# Patient Record
Sex: FEMALE | Race: BLACK OR AFRICAN AMERICAN | NOT HISPANIC OR LATINO | Employment: UNEMPLOYED | URBAN - METROPOLITAN AREA
[De-identification: names, ages, dates, MRNs, and addresses within clinical notes are randomized per-mention and may not be internally consistent; named-entity substitution may affect disease eponyms.]

---

## 2024-09-25 ENCOUNTER — TELEPHONE (OUTPATIENT)
Dept: GASTROENTEROLOGY | Facility: CLINIC | Age: 19
End: 2024-09-25
Payer: MEDICAID

## 2024-09-25 NOTE — TELEPHONE ENCOUNTER
----- Message from Love Nunn sent at 9/25/2024  9:56 AM CDT -----  Contact: roxie - mother  Patient is requesting a call back regarding scheduling. Please call back at [phone]

## 2025-01-24 DIAGNOSIS — N92.1 PROLONGED MENSTRUAL CYCLE: Primary | ICD-10-CM

## 2025-02-12 ENCOUNTER — OFFICE VISIT (OUTPATIENT)
Dept: OBSTETRICS AND GYNECOLOGY | Facility: CLINIC | Age: 20
End: 2025-02-12
Payer: MEDICAID

## 2025-02-12 VITALS
WEIGHT: 124.25 LBS | HEART RATE: 87 BPM | SYSTOLIC BLOOD PRESSURE: 119 MMHG | BODY MASS INDEX: 22.87 KG/M2 | DIASTOLIC BLOOD PRESSURE: 79 MMHG | HEIGHT: 62 IN

## 2025-02-12 DIAGNOSIS — N64.52 BREAST DISCHARGE: ICD-10-CM

## 2025-02-12 DIAGNOSIS — N76.0 BV (BACTERIAL VAGINOSIS): ICD-10-CM

## 2025-02-12 DIAGNOSIS — B96.89 BV (BACTERIAL VAGINOSIS): ICD-10-CM

## 2025-02-12 DIAGNOSIS — R79.89 ELEVATED PROLACTIN LEVEL: ICD-10-CM

## 2025-02-12 DIAGNOSIS — Z30.09 FAMILY PLANNING: ICD-10-CM

## 2025-02-12 DIAGNOSIS — Z11.3 VENEREAL DISEASE SCREENING: ICD-10-CM

## 2025-02-12 DIAGNOSIS — Z01.419 WELL WOMAN EXAM: Primary | ICD-10-CM

## 2025-02-12 LAB — PROLACTIN SERPL-MCNC: 40.6 NG/ML (ref 4.79–23.3)

## 2025-02-12 PROCEDURE — 3074F SYST BP LT 130 MM HG: CPT | Mod: CPTII,,, | Performed by: NURSE PRACTITIONER

## 2025-02-12 PROCEDURE — 99385 PREV VISIT NEW AGE 18-39: CPT | Mod: S$PBB,,, | Performed by: NURSE PRACTITIONER

## 2025-02-12 PROCEDURE — 1159F MED LIST DOCD IN RCRD: CPT | Mod: CPTII,,, | Performed by: NURSE PRACTITIONER

## 2025-02-12 PROCEDURE — 3078F DIAST BP <80 MM HG: CPT | Mod: CPTII,,, | Performed by: NURSE PRACTITIONER

## 2025-02-12 PROCEDURE — 3008F BODY MASS INDEX DOCD: CPT | Mod: CPTII,,, | Performed by: NURSE PRACTITIONER

## 2025-02-12 RX ORDER — LACTIC ACID, L-, CITRIC ACID MONOHYDRATE, AND POTASSIUM BITARTRATE 90; 50; 20 MG/5G; MG/5G; MG/5G
1 GEL VAGINAL
Qty: 60 G | Refills: 3 | Status: SHIPPED | OUTPATIENT
Start: 2025-02-12

## 2025-02-12 RX ORDER — NORGESTIMATE AND ETHINYL ESTRADIOL 0.25-0.035
1 KIT ORAL DAILY
Qty: 28 TABLET | Refills: 11 | Status: SHIPPED | OUTPATIENT
Start: 2025-02-12

## 2025-02-12 RX ORDER — NORGESTIMATE AND ETHINYL ESTRADIOL 0.25-0.035
1 KIT ORAL DAILY
COMMUNITY
Start: 2025-01-22 | End: 2025-02-12 | Stop reason: SDUPTHER

## 2025-02-12 RX ORDER — MULTIVITAMIN
1 TABLET ORAL DAILY
COMMUNITY

## 2025-02-12 RX ORDER — METRONIDAZOLE 500 MG/1
500 TABLET ORAL 2 TIMES DAILY
Qty: 14 TABLET | Refills: 0 | Status: SHIPPED | OUTPATIENT
Start: 2025-02-12 | End: 2025-02-19

## 2025-02-12 RX ORDER — FLUOXETINE 10 MG/1
10 CAPSULE ORAL DAILY
COMMUNITY
Start: 2025-02-06

## 2025-02-12 RX ORDER — DEXTROAMPHETAMINE SULFATE, DEXTROAMPHETAMINE SACCHARATE, AMPHETAMINE SULFATE AND AMPHETAMINE ASPARTATE 6.25; 6.25; 6.25; 6.25 MG/1; MG/1; MG/1; MG/1
25 CAPSULE, EXTENDED RELEASE ORAL EVERY MORNING
COMMUNITY
Start: 2025-01-08

## 2025-02-12 RX ORDER — HYDROXYZINE PAMOATE 50 MG/1
50 CAPSULE ORAL 4 TIMES DAILY
COMMUNITY

## 2025-02-12 RX ORDER — FEXOFENADINE HCL 60 MG/1
60 TABLET, FILM COATED ORAL DAILY
COMMUNITY

## 2025-02-12 RX ORDER — FLUOXETINE HYDROCHLORIDE 20 MG/1
20 CAPSULE ORAL DAILY
COMMUNITY
Start: 2025-02-05

## 2025-02-12 NOTE — LETTER
February 12, 2025      [place] [facility] - OB GYN  [address]  [place] [address]  Phone: [phone]  Fax: [phone]       Patient: Natty Fuentes   YOB: 2005  Date of Visit: 02/12/2025    To Whom It May Concern:    Lupe Fuentes  was at [facility] on 02/12/2025. The patient may return to work/school on 02/13/2025 with no restrictions. If you have any questions or concerns, or if I can be of further assistance, please do not hesitate to contact me.    Sincerely,    Yi Lovelace, NP

## 2025-02-12 NOTE — PROGRESS NOTES
"    Subjective:       Patient ID: Natty Fuentes is a 19 y.o. female.    Chief Complaint:  Well Woman and Vaginal Discharge      History of Present Illness   Presents for annual gyn exam. History and past labs reviewed with patient.    Complains right nipple discharge and vaginal discharge.  Reports she noticed nipple discharge when massaging her breast last month   Vaginal discharge has no associated ssx and is intermittent for the last few months.Hx of BV in the past   OCP for contraception  Menarche @ 12  Debut @ 17  1 lifetime partner  Requests extra pregnancy prevention r/t anxiety   Patient's last menstrual period was 2025 (exact date).      OB History          0    Para   0    Term   0       0    AB   0    Living   0         SAB   0    IAB   0    Ectopic   0    Multiple   0    Live Births   0                  Review of Systems  Review of Systems   Constitutional:  Negative for chills and fever.   Respiratory:  Negative for shortness of breath.    Cardiovascular:  Negative for chest pain.   Gastrointestinal:  Negative for abdominal pain, blood in stool, constipation, diarrhea, nausea, vomiting and reflux.   Genitourinary:  Positive for vaginal discharge. Negative for dysmenorrhea, dyspareunia, dysuria, hematuria, hot flashes, menorrhagia, menstrual problem, pelvic pain, vaginal bleeding, postcoital bleeding and vaginal dryness.   Musculoskeletal:  Negative for arthralgias and joint swelling.   Integumentary:  Positive for nipple discharge. Negative for rash, hair changes, breast mass and breast skin changes.   Psychiatric/Behavioral:  Negative for depression. The patient is not nervous/anxious.    Breast: Positive for nipple discharge.Negative for asymmetry, lump, mass and skin changes          Objective:     Vitals:    25 1029   BP: 119/79   Pulse: 87   Weight: 56.4 kg (124 lb 4 oz)   Height: 5' 2" (1.575 m)        Physical Exam:   Constitutional: She is oriented to person, place, " and time. She appears well-developed and well-nourished.    HENT:   Head: Normocephalic and atraumatic.    Eyes: Pupils are equal, round, and reactive to light. Conjunctivae are normal.    Neck: No thyromegaly present.    Cardiovascular:  Normal rate, regular rhythm and normal heart sounds.             Pulmonary/Chest: Effort normal and breath sounds normal. No respiratory distress.        Abdominal: Soft. There is no abdominal tenderness.     Genitourinary:    Inguinal canal, uterus, right adnexa and left adnexa normal.      Pelvic exam was performed with patient supine.   The external female genitalia was normal.   Genitalia hair distrobution normal .     Labial bartholins normal.Cervix is normal. There is vaginal discharge in the vagina. Uterus consistancy normal and Uerus contour normal  Uterus is not tender.           Musculoskeletal: Normal range of motion and moves all extremeties.       Neurological: She is alert and oriented to person, place, and time. She has normal reflexes.    Skin: Skin is warm and dry.    Psychiatric: She has a normal mood and affect. Her behavior is normal. Judgment and thought content normal.       breast exam wnl- no nipple dc, skin changes, masses or lymph nodes palpated bilaterally    Female chaperone present during exam     Slide examined under the microscope and there were per HPF  Clue cells  +many  Hyphae-none  Trichomonas -none  WBC-0-5      Assessment:     1. Well woman exam    2. Venereal disease screening    3. BV (bacterial vaginosis)    4. Family planning    5. Breast discharge              Plan:       Well woman exam  -     C. trachomatis/N. gonorrhoeae by AMP DNA Other; Cervicovaginal  -     Trichomonas Vaginalis, LUIS    Venereal disease screening  -     C. trachomatis/N. gonorrhoeae by AMP DNA Other; Cervicovaginal  -     Trichomonas Vaginalis, LUIS    BV (bacterial vaginosis)  -     metroNIDAZOLE (FLAGYL) 500 MG tablet; Take 1 tablet (500 mg total) by mouth 2 (two)  times a day. for 7 days  Dispense: 14 tablet; Refill: 0  -     POCT WET PREP    Family planning  -     ESTARYLLA 0.25-35 mg-mcg per tablet; Take 1 tablet by mouth once daily.  Dispense: 28 tablet; Refill: 11  -     lactic acid-citric-potassium (PHEXXI) 1.8-1-0.4 % Gel; Place 1 Applicatorful vaginally as needed (before each act of intercourse).  Dispense: 60 g; Refill: 3    Breast discharge  -     Prolactin; Future; Expected date: 02/12/2025       [facility] Women's daily Multi vitamin  Healthy diet, exercise and lifestyle discussed  gardasil discussed   Risk assessment for inherited gyn cancer done   Patient was counseled today on A.C.S. Pap guidelines and recommendations for yearly pelvic exams, mammograms and monthly self breast exams; to see her PCP for other health maintenance.   STD panel collected   Patient was counseled today on vaginitis prevention including :  a. avoiding feminine products such as deoderant soaps, body wash, bubble bath, douches, scented toilet paper, deoderant tampons or pads, feminine wipes, chronic pad use, etc.  b. avoiding other vulvovaginal irritants such as long hot baths, humidity, tight, synthetic clothing, chlorine and sitting around in wet bathing suits  c. wearing cotton underwear, avoiding thong underwear and no underwear to bed  d. taking showers instead of baths and use a hair dryer on cool setting afterwards to dry  e. wearing cotton to exercise and shower immediately after exercise and change clothes  f. using polyurethane condoms without spermicide if sexually active and symptoms are triggered by intercourse    FOLLOW UP: PRN lack of improvement.     Follow up in about 1 year (around 2/12/2026).

## 2025-02-13 LAB
CHLAMYDIA: NEGATIVE
GONORRHEA: NEGATIVE
SOURCE: NORMAL

## 2025-02-14 LAB
SOURCE: NORMAL
TRICHOMONAS AMPLIFIED: NEGATIVE

## 2025-02-17 ENCOUNTER — RESULTS FOLLOW-UP (OUTPATIENT)
Dept: OBSTETRICS AND GYNECOLOGY | Facility: CLINIC | Age: 20
End: 2025-02-17
Payer: MEDICAID

## 2025-02-17 NOTE — PROGRESS NOTES
I attempted to call her about results and sent  a message via portal. I ordered more labs and a breast ultrasound.

## 2025-02-20 ENCOUNTER — TELEPHONE (OUTPATIENT)
Dept: OBSTETRICS AND GYNECOLOGY | Facility: CLINIC | Age: 20
End: 2025-02-20
Payer: MEDICAID

## 2025-02-20 DIAGNOSIS — R82.90 URINE FINDINGS ABNORMAL: Primary | ICD-10-CM

## 2025-02-20 NOTE — TELEPHONE ENCOUNTER
Spoke with the patient and notified her of the results and recommendations. Patient verbalized understanding, Kinza        ----- Message from Yi Lovelace NP sent at 2/17/2025  4:18 PM CST -----  I attempted to call her about results and sent  a message via portal. I ordered more labs and a breast ultrasound.   ----- Message -----  From: Interface, [facility]  Sent: 2/12/2025   4:00 PM CST  To: Yi Lovelace NP

## 2025-02-20 NOTE — TELEPHONE ENCOUNTER
-      Spoke to pt and orders was faxed to scheduling. Pt is aware and voices understanding. Soraida          ---- Message from Lizzy sent at 2/20/2025  3:33 PM CST -----  Contact: Natty Luz is calling in regards a breast ultrasound order, she called the facility and they said they did not received it. She called Phone number  [phone]. She is not sure the name of the facility.Please call her back at [phone]!

## 2025-02-24 DIAGNOSIS — R79.89 ELEVATED TSH: Primary | ICD-10-CM

## 2025-03-12 ENCOUNTER — TELEPHONE (OUTPATIENT)
Dept: OBSTETRICS AND GYNECOLOGY | Facility: CLINIC | Age: 20
End: 2025-03-12
Payer: MEDICAID

## 2025-03-12 DIAGNOSIS — R79.89 ELEVATED TSH: ICD-10-CM

## 2025-03-12 DIAGNOSIS — R79.89 ELEVATED PROLACTIN LEVEL: Primary | ICD-10-CM

## 2025-03-12 NOTE — TELEPHONE ENCOUNTER
Notified of normal breast ultrasound.    Discussed TSH and prolactin elevation. Verified medications.  Will repeat tsh and prolactin in 2 weeks.   Instructed to avoid nipple stimulation.   All questions answered.

## 2025-03-31 ENCOUNTER — PATIENT MESSAGE (OUTPATIENT)
Dept: OBSTETRICS AND GYNECOLOGY | Facility: CLINIC | Age: 20
End: 2025-03-31
Payer: MEDICAID

## 2025-04-01 ENCOUNTER — RESULTS FOLLOW-UP (OUTPATIENT)
Dept: OBSTETRICS AND GYNECOLOGY | Facility: CLINIC | Age: 20
End: 2025-04-01

## 2025-04-01 DIAGNOSIS — R79.89 ELEVATED PROLACTIN LEVEL: Primary | ICD-10-CM

## 2025-04-07 ENCOUNTER — TELEPHONE (OUTPATIENT)
Dept: OBSTETRICS AND GYNECOLOGY | Facility: CLINIC | Age: 20
End: 2025-04-07
Payer: MEDICAID

## 2025-04-07 NOTE — TELEPHONE ENCOUNTER
-    Spoke to someone at [facility] . lala        ---- Message from [employer] sent at 4/3/2025  3:49 PM CDT -----  Contact: [employer] [phone]  Type:  Needs Medical AdviceWho Called: [employer] Symptoms (please be specific): PA/Procedure  Would the patient rather a call back or a response via [facility]? Call backBest Call Back Number: [phone] Information: needing PA for pt procedure 4/10  
25-Jun-2022

## 2025-04-14 ENCOUNTER — TELEPHONE (OUTPATIENT)
Dept: OBSTETRICS AND GYNECOLOGY | Facility: CLINIC | Age: 20
End: 2025-04-14
Payer: MEDICAID

## 2025-04-14 DIAGNOSIS — D35.2 PITUITARY MICROADENOMA WITH HYPERPROLACTINEMIA: Primary | ICD-10-CM

## 2025-04-14 DIAGNOSIS — E22.9 PITUITARY MICROADENOMA WITH HYPERPROLACTINEMIA: Primary | ICD-10-CM

## 2025-04-14 NOTE — TELEPHONE ENCOUNTER
Discussed results of brain MRI and the needs for referral to endocrinology.  Answered all questions

## 2025-05-01 ENCOUNTER — TELEPHONE (OUTPATIENT)
Dept: OBSTETRICS AND GYNECOLOGY | Facility: CLINIC | Age: 20
End: 2025-05-01
Payer: MEDICAID

## 2025-05-01 NOTE — TELEPHONE ENCOUNTER
--  Left message lala        --- Message from Inga sent at 5/1/2025  2:02 PM CDT -----  Contact: Savannah/mother  Patients mother calling regarding referral. Previous referred endocrinologist doesn't accept pts insurance. Needing referral rerouted to provider below. Pts mother can be reached at [phone]. Dr Marissa Land Fax: [phone]